# Patient Record
Sex: FEMALE | Race: WHITE | NOT HISPANIC OR LATINO | URBAN - NONMETROPOLITAN AREA
[De-identification: names, ages, dates, MRNs, and addresses within clinical notes are randomized per-mention and may not be internally consistent; named-entity substitution may affect disease eponyms.]

---

## 2021-02-18 ENCOUNTER — LAB OUTSIDE AN ENCOUNTER (OUTPATIENT)
Dept: URBAN - NONMETROPOLITAN AREA CLINIC 13 | Facility: CLINIC | Age: 26
End: 2021-02-18

## 2021-02-18 ENCOUNTER — OFFICE VISIT (OUTPATIENT)
Dept: URBAN - NONMETROPOLITAN AREA CLINIC 13 | Facility: CLINIC | Age: 26
End: 2021-02-18
Payer: COMMERCIAL

## 2021-02-18 DIAGNOSIS — R11.14 BILIOUS VOMITING WITH NAUSEA: ICD-10-CM

## 2021-02-18 PROCEDURE — G8427 DOCREV CUR MEDS BY ELIG CLIN: HCPCS | Performed by: INTERNAL MEDICINE

## 2021-02-18 PROCEDURE — G8484 FLU IMMUNIZE NO ADMIN: HCPCS | Performed by: INTERNAL MEDICINE

## 2021-02-18 PROCEDURE — G8417 CALC BMI ABV UP PARAM F/U: HCPCS | Performed by: INTERNAL MEDICINE

## 2021-02-18 PROCEDURE — 99204 OFFICE O/P NEW MOD 45 MIN: CPT | Performed by: INTERNAL MEDICINE

## 2021-02-18 PROCEDURE — G9903 PT SCRN TBCO ID AS NON USER: HCPCS | Performed by: INTERNAL MEDICINE

## 2021-02-18 RX ORDER — ONDANSETRON 4 MG/1
1 TABLET ON THE TONGUE AND ALLOW TO DISSOLVE TABLET, ORALLY DISINTEGRATING ORAL
Qty: 60 TABLET | Refills: 3 | OUTPATIENT
Start: 2021-02-18

## 2021-02-18 RX ORDER — PANTOPRAZOLE SODIUM 40 MG/1
1 TABLET TABLET, DELAYED RELEASE ORAL ONCE A DAY
Qty: 30 TABLET | Refills: 3 | OUTPATIENT
Start: 2021-02-18

## 2021-02-18 NOTE — PHYSICAL EXAM NECK/THYROID:
Dr. Deedee Kang aware of potassium 3.3. No orders given. normal appearance , without tenderness upon palpation , no deformities , trachea midline , Thyroid normal size , no thyroid nodules , no masses , no JVD , thyroid nontender

## 2021-02-23 ENCOUNTER — OFFICE VISIT (OUTPATIENT)
Dept: URBAN - NONMETROPOLITAN AREA SURGERY CENTER 1 | Facility: SURGERY CENTER | Age: 26
End: 2021-02-23
Payer: COMMERCIAL

## 2021-02-23 DIAGNOSIS — K29.60 ADENOPAPILLOMATOSIS GASTRICA: ICD-10-CM

## 2021-02-23 PROCEDURE — G8907 PT DOC NO EVENTS ON DISCHARG: HCPCS | Performed by: INTERNAL MEDICINE

## 2021-02-23 PROCEDURE — 43239 EGD BIOPSY SINGLE/MULTIPLE: CPT | Performed by: INTERNAL MEDICINE

## 2021-03-09 ENCOUNTER — TELEPHONE ENCOUNTER (OUTPATIENT)
Dept: URBAN - METROPOLITAN AREA CLINIC 92 | Facility: CLINIC | Age: 26
End: 2021-03-09

## 2021-03-09 ENCOUNTER — LAB OUTSIDE AN ENCOUNTER (OUTPATIENT)
Dept: URBAN - METROPOLITAN AREA CLINIC 92 | Facility: CLINIC | Age: 26
End: 2021-03-09

## 2021-03-24 ENCOUNTER — DASHBOARD ENCOUNTERS (OUTPATIENT)
Age: 26
End: 2021-03-24

## 2021-03-24 ENCOUNTER — OFFICE VISIT (OUTPATIENT)
Dept: URBAN - NONMETROPOLITAN AREA CLINIC 2 | Facility: CLINIC | Age: 26
End: 2021-03-24
Payer: COMMERCIAL

## 2021-03-24 DIAGNOSIS — R10.11 RIGHT UPPER QUADRANT ABDOMINAL PAIN: ICD-10-CM

## 2021-03-24 DIAGNOSIS — R11.14 BILIOUS VOMITING WITH NAUSEA: ICD-10-CM

## 2021-03-24 DIAGNOSIS — R19.8 IRREGULAR BOWEL HABITS: ICD-10-CM

## 2021-03-24 PROCEDURE — 99214 OFFICE O/P EST MOD 30 MIN: CPT | Performed by: NURSE PRACTITIONER

## 2021-03-24 RX ORDER — ONDANSETRON 4 MG/1
PRN TABLET, ORALLY DISINTEGRATING ORAL
Refills: 3 | Status: ACTIVE | COMMUNITY
Start: 2021-02-18

## 2021-03-24 RX ORDER — ONDANSETRON 4 MG/1
PRN TABLET, ORALLY DISINTEGRATING ORAL
OUTPATIENT
Start: 2021-02-18

## 2021-03-24 RX ORDER — PANTOPRAZOLE SODIUM 40 MG/1
1 TABLET TABLET, DELAYED RELEASE ORAL ONCE A DAY
OUTPATIENT
Start: 2021-02-18

## 2021-03-24 RX ORDER — PANTOPRAZOLE SODIUM 40 MG/1
1 TABLET TABLET, DELAYED RELEASE ORAL ONCE A DAY
Qty: 30 TABLET | Refills: 3 | Status: ACTIVE | COMMUNITY
Start: 2021-02-18

## 2021-03-24 NOTE — HPI-TODAY'S VISIT:
2/18/21-Dr. Muniz This is the first office visit for this 25-year-old white female who is self-referred for evaluation of vomiting. Patient states that she has been vomiting about once a week for the last 2 to 3 months.  The vomiting is not always preceded by nausea.  She may develop a tightness in her throat and then simply throw up.  She has  awakened in the middle of the night to throw up so that the vomiting is not necessarily postprandial.  It is not associated with heartburn, indigestion, belching or regurgitation.  She has no dysphagia.  She has no hematemesis or coffee-ground emesis. She has noted some right upper quadrant and right flank pain recently.  This is not always present when she vomits.  She has no past history of peptic ulcer disease or gallbladder disease.  She has no known history of delayed gastric emptying. Her bowel movements are normal.  She has about 2 formed stools daily.  She is not particularly constipated.  There is no evidence of GI bleeding. Patient does not have diabetes.  She has had no gastroenteritis recently.  She is on no new medications. 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  3/24/2021 Chrissy is a very pleasant 26-year-old female who presents for follow-up after EGD that was done for nausea vomiting.  EGD 2/23/2021 with normal esophagus, gastric mucosa, and duodenum.  Noted to have absent peristalsis suspicious for gastroparesis.  Gastric biopsies consistent with chronic gastritis, negative for Helicobacter pylori.  No significant histologic changes on esophageal biopsy.  Right upper quadrant ultrasound 3/2/2021 was normal.  HIDA has been ordered and she is scheduled to complete this on 4/5/2021. She continues on pantoprazole 40 mg once daily and Zofran as needed.  Today she reports she has not vomited since 2/17/2021.  She has had multiple episodes of nausea.  Today we reviewed diet around the flares as well as bowel movements.  She does tend to have loose stools associated with the nausea and typically has eaten some type of fatty food.  No rectal bleeding.  On several occurrences symptoms have started while traveling, such as a bachelorette weekend this past weekend and New Year's Eve weekend.  She continues with intermittent right upper quadrant pain and tenderness.  No fever or chills.  She has no risk factors for gastroparesis.  Denies marijuana use.  She does have a feeling of fullness in the abdomen and appetite is somewhat diminished. TG

## 2021-05-17 ENCOUNTER — OFFICE VISIT (OUTPATIENT)
Dept: URBAN - NONMETROPOLITAN AREA CLINIC 2 | Facility: CLINIC | Age: 26
End: 2021-05-17